# Patient Record
Sex: FEMALE | Race: OTHER
[De-identification: names, ages, dates, MRNs, and addresses within clinical notes are randomized per-mention and may not be internally consistent; named-entity substitution may affect disease eponyms.]

---

## 2018-10-04 ENCOUNTER — HOSPITAL ENCOUNTER (OUTPATIENT)
Dept: HOSPITAL 76 - LAB.F | Age: 33
Discharge: HOME | End: 2018-10-04
Attending: NURSE PRACTITIONER
Payer: MEDICAID

## 2018-10-04 DIAGNOSIS — Z86.19: ICD-10-CM

## 2018-10-04 DIAGNOSIS — Z13.89: Primary | ICD-10-CM

## 2018-10-04 PROCEDURE — 36415 COLL VENOUS BLD VENIPUNCTURE: CPT

## 2018-10-04 PROCEDURE — 86787 VARICELLA-ZOSTER ANTIBODY: CPT

## 2018-10-04 PROCEDURE — 86317 IMMUNOASSAY INFECTIOUS AGENT: CPT

## 2018-11-23 ENCOUNTER — HOSPITAL ENCOUNTER (OUTPATIENT)
Dept: HOSPITAL 76 - DI | Age: 33
Discharge: HOME | End: 2018-11-23
Attending: NURSE PRACTITIONER
Payer: MEDICAID

## 2018-11-23 DIAGNOSIS — N63.23: Primary | ICD-10-CM

## 2018-11-23 PROCEDURE — 76642 ULTRASOUND BREAST LIMITED: CPT

## 2018-11-23 PROCEDURE — 77066 DX MAMMO INCL CAD BI: CPT

## 2018-11-23 NOTE — MAMMOGRAPHY REPORT
Reason:  BREAST LESION

Procedure Date:  11/23/2018   

Accession Number:  [id number] / [id number]                    

Procedure:  ROSANNA - Diagnostic Dig Bilat CPT Code:  

 

FULL RESULT:

 

 

EXAM: Diagnostic Dig Bilat

 

DATE: 11/23/2018 11:54 AM

 

CLINICAL HISTORY: 33-year-old female with a palpable lump in the left 

breast which has been there for 4 to 5 months. There is no associated 

pain. The lump feels constant in size.

 

TECHNIQUE: Bilateral CC and MLO views were obtained with 2-D and 3-D 

technique. Additionally, left spot CC and MLO views of the finding were 

obtained. A focused diagnostic breast ultrasound was also performed.

 

COMPARISON: 9/19/2014.

 

FINDINGS:

The breasts demonstrate heterogeneously dense fibroglandular parenchyma 

bilaterally. Mammographically there is a focal asymmetry in the region 

corresponding to the palpable marker which is isodense to glandular 

breast tissue. Focused breast ultrasound identifies a 1.3 x 0.6 x 0.9 cm 

gently lobulated wider than tall hypoechoic mass with an appearance 

suggestive of fibroadenoma in the left breast at the 4:00 position 

approximately 3 cm from the nipple.

 

IMPRESSION: Probable benign findings

 

RECOMMENDATION: Recommend diagnostic left breast ultrasound in 6 months.

 

BIRADS CATEGORY 3

 

STANDARD QUALIFYING STATEMENTS:

1.  This examination was not reviewed with the aid of Computer-Aided 

Detection (CAD).

2.  A negative or benign  imaging report should not delay biopsy if 

clinically suspicious findings are present.  Consider surgical 

consultation if warrented.  More than 5% of cancers are not identified by 

imaging.

3.  Dense breasts may obscure an underlying neoplasm.

4. This examination was reviewed with the aid of 3D imaging (tomography).

## 2018-12-11 ENCOUNTER — HOSPITAL ENCOUNTER (OUTPATIENT)
Dept: HOSPITAL 76 - DI | Age: 33
Discharge: HOME | End: 2018-12-11
Attending: NURSE PRACTITIONER
Payer: MEDICAID

## 2018-12-11 DIAGNOSIS — M47.9: Primary | ICD-10-CM

## 2018-12-11 PROCEDURE — 72110 X-RAY EXAM L-2 SPINE 4/>VWS: CPT

## 2018-12-11 NOTE — XRAY REPORT
Reason:  LUMBOSACRAL RADICULITIS

Procedure Date:  12/11/2018   

Accession Number:  [id number] / [id number]                    

Procedure:  XR  - Lumbar Spine Complete CPT Code:  

 

FULL RESULT:

 

 

EXAM:

LUMBOSACRAL SPINE RADIOGRAPHY

 

EXAM DATE: 12/11/2018 01:55 PM.

 

CLINICAL HISTORY: Lumbosacral radiculitis.

COMPARISONS: None.

 

TECHNIQUE: 3 views.

 

FINDINGS:

Alignment: Mild dextroconvex lumbar scoliosis centered about L3. No 

spondylolisthesis or scoliosis.

 

Bones: Five non-rib-bearing lumbar vertebral bodies are present. No 

fractures or bone lesions.

 

Disks: Normal. Disk heights are maintained.

 

Facets: Moderate facet arthropathy predominantly at L4 and L5.

 

Sacroiliac Joints: Unremarkable.

 

Soft Tissues: Normal. The visualized bowel gas pattern is normal.

IMPRESSION: Mild degenerative changes as described.

 

[facility]

## 2019-06-03 ENCOUNTER — HOSPITAL ENCOUNTER (OUTPATIENT)
Dept: HOSPITAL 76 - LAB.F | Age: 34
Discharge: HOME | End: 2019-06-03
Attending: NURSE PRACTITIONER
Payer: SELF-PAY

## 2019-06-03 DIAGNOSIS — Z13.89: Primary | ICD-10-CM

## 2019-06-03 PROCEDURE — 86704 HEP B CORE ANTIBODY TOTAL: CPT

## 2019-06-03 PROCEDURE — 36415 COLL VENOUS BLD VENIPUNCTURE: CPT

## 2019-07-22 ENCOUNTER — HOSPITAL ENCOUNTER (OUTPATIENT)
Dept: HOSPITAL 76 - LAB.S | Age: 34
Discharge: HOME | End: 2019-07-22
Attending: NURSE PRACTITIONER
Payer: SELF-PAY

## 2019-07-22 DIAGNOSIS — Z13.89: Primary | ICD-10-CM

## 2019-07-22 PROCEDURE — 86765 RUBEOLA ANTIBODY: CPT

## 2019-07-22 PROCEDURE — 86787 VARICELLA-ZOSTER ANTIBODY: CPT

## 2019-07-22 PROCEDURE — 36415 COLL VENOUS BLD VENIPUNCTURE: CPT

## 2019-07-22 PROCEDURE — 86317 IMMUNOASSAY INFECTIOUS AGENT: CPT

## 2019-07-22 PROCEDURE — 86762 RUBELLA ANTIBODY: CPT

## 2019-08-05 ENCOUNTER — HOSPITAL ENCOUNTER (OUTPATIENT)
Dept: HOSPITAL 76 - LAB | Age: 34
Discharge: HOME | End: 2019-08-05
Attending: NURSE PRACTITIONER
Payer: SELF-PAY

## 2019-08-05 DIAGNOSIS — Z23: Primary | ICD-10-CM

## 2019-08-05 PROCEDURE — 81599 UNLISTED MAAA: CPT

## 2019-08-05 PROCEDURE — 36415 COLL VENOUS BLD VENIPUNCTURE: CPT

## 2019-08-05 PROCEDURE — 86480 TB TEST CELL IMMUN MEASURE: CPT
